# Patient Record
Sex: FEMALE | Race: WHITE | NOT HISPANIC OR LATINO | Employment: FULL TIME | URBAN - METROPOLITAN AREA
[De-identification: names, ages, dates, MRNs, and addresses within clinical notes are randomized per-mention and may not be internally consistent; named-entity substitution may affect disease eponyms.]

---

## 2024-01-16 ENCOUNTER — APPOINTMENT (OUTPATIENT)
Dept: RADIOLOGY | Facility: HOSPITAL | Age: 34
End: 2024-01-16

## 2024-01-16 ENCOUNTER — HOSPITAL ENCOUNTER (EMERGENCY)
Facility: HOSPITAL | Age: 34
Discharge: HOME | End: 2024-01-16

## 2024-01-16 VITALS
TEMPERATURE: 97.3 F | WEIGHT: 202 LBS | BODY MASS INDEX: 32.47 KG/M2 | SYSTOLIC BLOOD PRESSURE: 107 MMHG | HEART RATE: 89 BPM | HEIGHT: 66 IN | RESPIRATION RATE: 18 BRPM | DIASTOLIC BLOOD PRESSURE: 72 MMHG

## 2024-01-16 DIAGNOSIS — N83.202 CYST OF LEFT OVARY: Primary | ICD-10-CM

## 2024-01-16 LAB
ALBUMIN SERPL BCP-MCNC: 4 G/DL (ref 3.4–5)
ALP SERPL-CCNC: 56 U/L (ref 33–110)
ALT SERPL W P-5'-P-CCNC: 22 U/L (ref 7–45)
ANION GAP SERPL CALC-SCNC: 13 MMOL/L (ref 10–20)
APPEARANCE UR: ABNORMAL
AST SERPL W P-5'-P-CCNC: 16 U/L (ref 9–39)
B-HCG SERPL-ACNC: <2 MIU/ML
BASOPHILS # BLD AUTO: 0.04 X10*3/UL (ref 0–0.1)
BASOPHILS NFR BLD AUTO: 0.3 %
BILIRUB SERPL-MCNC: 0.3 MG/DL (ref 0–1.2)
BILIRUB UR STRIP.AUTO-MCNC: NEGATIVE MG/DL
BUN SERPL-MCNC: 14 MG/DL (ref 6–23)
CALCIUM SERPL-MCNC: 8.5 MG/DL (ref 8.6–10.3)
CHLORIDE SERPL-SCNC: 103 MMOL/L (ref 98–107)
CLUE CELLS SPEC QL WET PREP: NORMAL
CO2 SERPL-SCNC: 24 MMOL/L (ref 21–32)
COLOR UR: YELLOW
CREAT SERPL-MCNC: 0.71 MG/DL (ref 0.5–1.05)
EGFRCR SERPLBLD CKD-EPI 2021: >90 ML/MIN/1.73M*2
EOSINOPHIL # BLD AUTO: 0.08 X10*3/UL (ref 0–0.7)
EOSINOPHIL NFR BLD AUTO: 0.7 %
ERYTHROCYTE [DISTWIDTH] IN BLOOD BY AUTOMATED COUNT: 13.1 % (ref 11.5–14.5)
GLUCOSE SERPL-MCNC: 93 MG/DL (ref 74–99)
GLUCOSE UR STRIP.AUTO-MCNC: NEGATIVE MG/DL
HCT VFR BLD AUTO: 44 % (ref 36–46)
HGB BLD-MCNC: 14.5 G/DL (ref 12–16)
IMM GRANULOCYTES # BLD AUTO: 0.07 X10*3/UL (ref 0–0.7)
IMM GRANULOCYTES NFR BLD AUTO: 0.6 % (ref 0–0.9)
KETONES UR STRIP.AUTO-MCNC: NEGATIVE MG/DL
LACTATE SERPL-SCNC: 0.8 MMOL/L (ref 0.4–2)
LEUKOCYTE ESTERASE UR QL STRIP.AUTO: NEGATIVE
LIPASE SERPL-CCNC: 35 U/L (ref 9–82)
LYMPHOCYTES # BLD AUTO: 3 X10*3/UL (ref 1.2–4.8)
LYMPHOCYTES NFR BLD AUTO: 25.7 %
MCH RBC QN AUTO: 28.7 PG (ref 26–34)
MCHC RBC AUTO-ENTMCNC: 33 G/DL (ref 32–36)
MCV RBC AUTO: 87 FL (ref 80–100)
MONOCYTES # BLD AUTO: 0.83 X10*3/UL (ref 0.1–1)
MONOCYTES NFR BLD AUTO: 7.1 %
NEUTROPHILS # BLD AUTO: 7.64 X10*3/UL (ref 1.2–7.7)
NEUTROPHILS NFR BLD AUTO: 65.6 %
NITRITE UR QL STRIP.AUTO: NEGATIVE
NRBC BLD-RTO: 0 /100 WBCS (ref 0–0)
PH UR STRIP.AUTO: 5 [PH]
PLATELET # BLD AUTO: 214 X10*3/UL (ref 150–450)
POTASSIUM SERPL-SCNC: 4.7 MMOL/L (ref 3.5–5.3)
PROT SERPL-MCNC: 6.9 G/DL (ref 6.4–8.2)
PROT UR STRIP.AUTO-MCNC: NEGATIVE MG/DL
RBC # BLD AUTO: 5.05 X10*6/UL (ref 4–5.2)
RBC # UR STRIP.AUTO: NEGATIVE /UL
SODIUM SERPL-SCNC: 135 MMOL/L (ref 136–145)
SP GR UR STRIP.AUTO: 1.01
T VAGINALIS SPEC QL WET PREP: NORMAL
UROBILINOGEN UR STRIP.AUTO-MCNC: <2 MG/DL
WBC # BLD AUTO: 11.7 X10*3/UL (ref 4.4–11.3)
WBC VAG QL WET PREP: NORMAL
YEAST VAG QL WET PREP: NORMAL

## 2024-01-16 PROCEDURE — 2550000001 HC RX 255 CONTRASTS

## 2024-01-16 PROCEDURE — 84702 CHORIONIC GONADOTROPIN TEST: CPT

## 2024-01-16 PROCEDURE — 81003 URINALYSIS AUTO W/O SCOPE: CPT

## 2024-01-16 PROCEDURE — 74177 CT ABD & PELVIS W/CONTRAST: CPT

## 2024-01-16 PROCEDURE — 76830 TRANSVAGINAL US NON-OB: CPT | Performed by: RADIOLOGY

## 2024-01-16 PROCEDURE — 84075 ASSAY ALKALINE PHOSPHATASE: CPT

## 2024-01-16 PROCEDURE — 83690 ASSAY OF LIPASE: CPT

## 2024-01-16 PROCEDURE — 36415 COLL VENOUS BLD VENIPUNCTURE: CPT

## 2024-01-16 PROCEDURE — 99285 EMERGENCY DEPT VISIT HI MDM: CPT

## 2024-01-16 PROCEDURE — 76856 US EXAM PELVIC COMPLETE: CPT

## 2024-01-16 PROCEDURE — 85025 COMPLETE CBC W/AUTO DIFF WBC: CPT

## 2024-01-16 PROCEDURE — 83605 ASSAY OF LACTIC ACID: CPT

## 2024-01-16 PROCEDURE — 87800 DETECT AGNT MULT DNA DIREC: CPT | Mod: AHULAB

## 2024-01-16 PROCEDURE — 74177 CT ABD & PELVIS W/CONTRAST: CPT | Performed by: RADIOLOGY

## 2024-01-16 PROCEDURE — 87210 SMEAR WET MOUNT SALINE/INK: CPT

## 2024-01-16 PROCEDURE — 76856 US EXAM PELVIC COMPLETE: CPT | Performed by: RADIOLOGY

## 2024-01-16 RX ORDER — KETOROLAC TROMETHAMINE 30 MG/ML
15 INJECTION, SOLUTION INTRAMUSCULAR; INTRAVENOUS ONCE
Status: DISCONTINUED | OUTPATIENT
Start: 2024-01-16 | End: 2024-01-16 | Stop reason: HOSPADM

## 2024-01-16 RX ORDER — NAPROXEN 500 MG/1
500 TABLET ORAL EVERY 12 HOURS
Qty: 6 TABLET | Refills: 0 | Status: SHIPPED | OUTPATIENT
Start: 2024-01-16 | End: 2024-01-19

## 2024-01-16 RX ORDER — ACETAMINOPHEN 325 MG/1
975 TABLET ORAL ONCE
Status: COMPLETED | OUTPATIENT
Start: 2024-01-16 | End: 2024-01-16

## 2024-01-16 RX ORDER — KETOROLAC TROMETHAMINE 30 MG/ML
INJECTION, SOLUTION INTRAMUSCULAR; INTRAVENOUS
Status: DISCONTINUED
Start: 2024-01-16 | End: 2024-01-16 | Stop reason: HOSPADM

## 2024-01-16 RX ORDER — ACETAMINOPHEN 500 MG
1000 TABLET ORAL EVERY 8 HOURS PRN
Qty: 18 TABLET | Refills: 0 | Status: SHIPPED | OUTPATIENT
Start: 2024-01-16 | End: 2024-01-19

## 2024-01-16 RX ADMIN — ACETAMINOPHEN 975 MG: 325 TABLET ORAL at 11:15

## 2024-01-16 RX ADMIN — IOHEXOL 75 ML: 350 INJECTION, SOLUTION INTRAVENOUS at 15:10

## 2024-01-16 ASSESSMENT — PAIN SCALES - GENERAL: PAINLEVEL_OUTOF10: 6

## 2024-01-16 ASSESSMENT — PAIN DESCRIPTION - LOCATION: LOCATION: PELVIS

## 2024-01-16 ASSESSMENT — COLUMBIA-SUICIDE SEVERITY RATING SCALE - C-SSRS
1. IN THE PAST MONTH, HAVE YOU WISHED YOU WERE DEAD OR WISHED YOU COULD GO TO SLEEP AND NOT WAKE UP?: NO
2. HAVE YOU ACTUALLY HAD ANY THOUGHTS OF KILLING YOURSELF?: NO
6. HAVE YOU EVER DONE ANYTHING, STARTED TO DO ANYTHING, OR PREPARED TO DO ANYTHING TO END YOUR LIFE?: NO

## 2024-01-16 ASSESSMENT — PAIN - FUNCTIONAL ASSESSMENT: PAIN_FUNCTIONAL_ASSESSMENT: 0-10

## 2024-01-16 NOTE — ED PROVIDER NOTES
HPI   Chief Complaint   Patient presents with    Pelvic Pain        33-year-old female no significant past medical history presents the ED today with a chief concern of pelvic pain.  Patient is a symptoms have been present for 8 months now.  She states that initially when this started her PCP did an STD check and diagnosed her with chlamydia and her symptoms went away.  She states that over the past couple months they have returned.  She states that she endorses bilateral pelvic pain worse when she works out or when she has intercourse.  She states that over the past 2 weeks her symptoms have worsened.  She states that her symptoms are typically worse before urination or when she has a bowel movement.  She denies any fever/chills, nausea/vomiting.  Denies any diarrhea or hematochezia.  Endorses slight suprapubic tenderness.  Denies dysuria, urinary urgency/frequency, hematuria.  She denies any vaginal discharge or vaginal bleeding.  States that the pain is a cramping pain.  States that she does feel a little bit of pain that wraps around into her low back on the left side which started over the past 2 weeks as well.  She denies saddle anesthesia.  Denies any urinary/fecal incontinence or retention.  She states that she never got a formal diagnosis but her PCP thought she maybe had PCOS.  She has no other symptoms or concerns at this time.  Patient has not tried anything at home for her symptoms.  She denies any previous abdominal surgeries.      History provided by:  Patient   used: No                        No data recorded                Patient History   No past medical history on file.  No past surgical history on file.  No family history on file.  Social History     Tobacco Use    Smoking status: Not on file    Smokeless tobacco: Not on file   Substance Use Topics    Alcohol use: Not on file    Drug use: Not on file       Physical Exam   ED Triage Vitals [01/16/24 0942]   Temp Heart Rate  Resp BP   36.3 °C (97.3 °F) 89 18 (!) 124/96      SpO2 Temp Source Heart Rate Source Patient Position   -- Temporal -- --      BP Location FiO2 (%)     Left arm --       Physical Exam  Constitutional: Vital signs per nursing notes.  Well developed, well nourished.  No acute distress.    Psychiatric: alert and oriented to person, place, and time; no abnormalities of mood or affect; memory intact  Eyes: PERRL; conjunctivae and lids normal  ENT: Ears normal externally; face symmetric. voice normal  Neck: neck supple, no meningismus; trachea midline without deviation  Respiratory: normal respiratory effort and excursion; no rales, rhonchi, or wheezes; equal air entry  Cardiovascular: RRR, 2+ radial pulses extremities   Neurological: normal speech; CN II-XII grossly intact, normal motor and sensory function  GI: no distention, soft.  Minimal tenderness in suprapubic region.  No rebound tenderness or guarding.  No palpable masses.  No pulsatile masses.  No CVA tenderness.  : Deferred  Musculoskeletal: normal gait and station; normal digits and nails; normal to palpation; normal strength/tone; neurovascular status intact.  Skin: normal to inspection; normal to palpation; no rash    ED Course & MDM   ED Course as of 01/16/24 1755   Tue Jan 16, 2024   1544 FINDINGS:  UTERUS:  The uterus is grossly unremarkable in appearance.      The uterus measures at 7.2 cm in length and 3.0 x 4.3 cm in AP and  transverse diameters.      ENDOMETRIUM:  The endometrium measures a thickness of 6 mm, which is normal.      RIGHT ADNEXA:  The right ovary measures at 2.4 x 3.2 x 2.7 cm.      Fall colles are seen within the right ovary.      Normal-appearing color Doppler flow is seen in the right ovary.      No right adnexal mass is identified.      LEFT ADNEXA:  The left ovary measures at 5.1 x 5.9 x 4.5 cm.      A complex cyst is seen within the left ovary, measuring at up to 3.5  x 4.7 x 3.5 cm in diameter.      Normal-appearing color  Doppler flow is seen in the left ovary.      No left adnexal masses identified.      CUL DE SAC:  No free fluid is identified.      IMPRESSION:  1. Complex cyst in the left ovary, likely representing a hemorrhagic  cyst.  2. Otherwise unremarkable ultrasound appearance of the pelvis.      MACRO:  None      Signed by: Dominic Valentine 1/16/2024 1:40 PM  Dictation workstation:   [id number]   []   1544 IMPRESSION:  1.  5 cm left ovarian cyst. Trace pelvic free fluid likely  physiologic.  2. No additional acute findings.      MACRO:  None.      Signed by: Randy Bennett 1/16/2024 3:33 PM  Dictation workstation:   [id number]   [MC]   1544 CBC shows no evidence of leukocytosis or anemia.  CMP shows no acute or acute liver injury.  Urinalysis shows no UTI.  Lactate, lipase, hCG normal.  Wet prep shows no trichomonas, clue cells, or yeast.  Gonorrhea and Chlamydia pending.  Patient was not treated in advance for these in the ED. []   1623 Discussed with Dr. Hough who recommends discharging this patient and having her follow-up with him outpatient this week. []      ED Course User Index  [MC] Gilberto Francisco PA-C         Diagnoses as of 01/16/24 1755   Cyst of left ovary       Medical Decision Making  33-year-old female no significant past medical history presents the ED today with a chief concern of pelvic pain.  Vital signs are reassuring.  Patient overall appears well and is nontoxic-appearing.  She was given Tylenol in the ED. patient was also given a dose of Toradol before she left.  She overall appears well and is nontoxic-appearing.  She has no signs of cord compression.  No emergent MRI indicated at this time.  She has 2+ symmetric radial pulses.  I am not concerned for any AAA at this time.  Ultrasound shows no evidence of ovarian torsion.  She does have a 5 cm cyst noted on CT scan.  Patient states that she was having pain when she was at the ultrasound.  I have low suspicion for any intermittent ovarian torsion  at this time however I did warn patient about this risk.  Her hemoglobin is stable.  She has no rupture of the cyst at this time.  She is satting well on room air.  She has no systemic signs or symptoms.  I am not concerned for any abscess at this time.  Discussed with OB/GYN who recommends having this patient follow-up outpatient next week.  Her urine shows no evidence of UTI and the remainder of her lab work is stable with no leukocytosis.  Her white count is 11.7.  No left shift.  No surgical abdomen.  No signs of PID or TOA.  GC/CHL cultures pending.  Patient is not immunocompromised.  Discussed with attending physician but I did not have him see this patient. Discussed my impression and findings with patient she feels comfortable returning home.  We discussed very strict return precautions including returning for any new or worsening signs or symptoms.  Patient is in agreement this plan.  She will follow-up with her PCP and OB/GYN within 3 days.  Again discussed strict return precautions.  Discussed signs and symptoms of ovarian torsion.  I offered to send analgesics to patient's pharmacy but she declined.    Differential diagnosis: PCOS, fibroids, cyst, ovarian torsion, PID, TOA, GC/CHL, UTI, pyelonephritis, ureterolithiasis, nephrolithiasis, appendicitis, cholecystitis    Disposition/treatment  1.  Cyst of left ovary    Shared decision-making was used patient feels comfortable returning home     Patient was advised to follow up with recommended provider in 1 day1 for another evaluation and exam. I advised patient/guardian to return or go to closest emergency room immediately if symptoms change, get worse, new symptoms develop prior to follow up. If there is no improvement in symptoms in the next 24 hours they are advised to return for further evaluation and exam. I also explained the plan and treatment course. Patient/guardian is in agreement with plan, treatment course, and follow up and states verbally that  they will comply.    Homegoing. I discussed the differential; results and discharge plan with the patient and/or family/friend/caregiver if present.  I emphasized the importance of follow-up with the physician I referred them to in the timeframe recommended.  I explained reasons for the patient to return to the Emergency Department. They agreed that if they feel their condition is worsening or if they have any other concern they should call 911 immediately for further assistance. I gave the patient an opportunity to ask all questions they had and answered all of them accordingly. They understand return precautions and discharge instructions. The patient and/or family/friend/caregiver expressed understanding verbally and that they would comply.        This note has been transcribed using voice recognition and may contain grammatical errors, misplaced words, incorrect words, incorrect phrases or other errors.        Procedure  Procedures     Gilberto Francisco PA-C  01/16/24 0529

## 2024-01-16 NOTE — DISCHARGE INSTRUCTIONS
Return to the ED immediately if you have any new or worsening signs or symptoms  Please follow-up with gynecology within 3 days  If you begin to experience fever/chills, nausea/vomiting, severe left-sided abdominal pain, or any other new or worsening signs or symptoms please return to the ED immediately

## 2024-01-16 NOTE — ED TRIAGE NOTES
Pt arrived via private vehicle with chief c/o of pelvic pain. Pt states the pain started last year and she tested positive for chlamydia. Pt reports being treated and following up GYN for follow up as the pain persisted. Pt states the pain went away for a short time but came back shortly after, and she endorses pain during sex , during exercise and pelvic pain during urination/BM's. Pt has hx of possible PCOS, still does not get MP after being off BC for over a year. She did have 4 days of bleeding in October. Pt states she changed her diet/lifestyle to see if that would help with no changes. Pt states she had UA completed at  which was negative for a UTI so she was sent to ED for US and bloodwork.

## 2024-01-16 NOTE — ED NOTES
CHW talked to patient regarding not having any insurance and no primary care physician. CHW explained the [facility] that has various programs to help with medical bills, insurance and no insurance issues. CHW gave the form to patient to complete and explained that some will follow-up from [facility]. Patient expressed appreciation.     [facility]  01/16/24 2723

## 2024-01-17 ENCOUNTER — OFFICE VISIT (OUTPATIENT)
Dept: OBSTETRICS AND GYNECOLOGY | Facility: CLINIC | Age: 34
End: 2024-01-17

## 2024-01-17 VITALS — WEIGHT: 208 LBS | BODY MASS INDEX: 33.43 KG/M2 | HEIGHT: 66 IN

## 2024-01-17 DIAGNOSIS — E28.2 PCOS (POLYCYSTIC OVARIAN SYNDROME): Primary | ICD-10-CM

## 2024-01-17 DIAGNOSIS — N83.209 CYST OF OVARY, UNSPECIFIED LATERALITY: ICD-10-CM

## 2024-01-17 LAB
C TRACH RRNA SPEC QL NAA+PROBE: NEGATIVE
N GONORRHOEA DNA SPEC QL PROBE+SIG AMP: NEGATIVE

## 2024-01-17 PROCEDURE — 99204 OFFICE O/P NEW MOD 45 MIN: CPT | Performed by: OBSTETRICS & GYNECOLOGY

## 2024-01-17 PROCEDURE — 1036F TOBACCO NON-USER: CPT | Performed by: OBSTETRICS & GYNECOLOGY

## 2024-01-17 RX ORDER — MEDROXYPROGESTERONE ACETATE 10 MG/1
10 TABLET ORAL DAILY
Qty: 14 TABLET | Refills: 3 | Status: SHIPPED | OUTPATIENT
Start: 2024-01-17 | End: 2024-04-17 | Stop reason: SDUPTHER

## 2024-01-17 RX ORDER — METFORMIN HYDROCHLORIDE 500 MG/1
500 TABLET, EXTENDED RELEASE ORAL
Qty: 30 TABLET | Refills: 11 | Status: SHIPPED | OUTPATIENT
Start: 2024-01-17 | End: 2024-04-17 | Stop reason: SDUPTHER

## 2024-01-17 NOTE — PROGRESS NOTES
Samuel Seals is a 33 y.o. female who presents with a chief complaint of Follow-up (Patient went to the ER with pelvic pain, had CT and ultrasound done and was told she had ovarian left side)      SUBJECTIVE  Patient presents for follow-up from the emergency room.  She was seen and had a ovarian cyst that appeared to be hemorrhagic corpus luteum.  She has a history of only 1.  In the last year or so.  She was birth control previously and has been very irregular since coming off.  She has been described as prediabetic in the past and was diagnosed with polycystic ovarian syndrome.  She currently has no insurance so really cannot do labs today.  We discussed all this at length    Past Medical History:   Diagnosis Date    Anxiety     Depression     Hypothyroid     PTSD (post-traumatic stress disorder)      Past Surgical History:   Procedure Laterality Date    TONSILLECTOMY       Social History     Socioeconomic History    Marital status: Single     Spouse name: None    Number of children: None    Years of education: None    Highest education level: None   Occupational History    None   Tobacco Use    Smoking status: Never    Smokeless tobacco: Never   Vaping Use    Vaping Use: Some days    Substances: Nicotine    Devices: Disposable   Substance and Sexual Activity    Alcohol use: Not Currently    Drug use: Yes     Types: Marijuana    Sexual activity: Not Currently   Other Topics Concern    None   Social History Narrative    None     Social Determinants of Health     Financial Resource Strain: Not on file   Food Insecurity: Not on file   Transportation Needs: Not on file   Physical Activity: Not on file   Stress: Not on file   Social Connections: Not on file   Intimate Partner Violence: Not on file   Housing Stability: Not on file     No family history on file.    OB History    Para Term  AB Living   3 0 0 0 3 0   SAB IAB Ectopic Multiple Live Births   3 0 0 0 0      # Outcome Date GA Lbr Steven/2nd  "Weight Sex Delivery Anes PTL Lv   3 SAB            2 SAB            1 SAB                OBJECTIVE  Allergies   Allergen Reactions    Gluten GI Upset      (Not in a hospital admission)       Review of Systems  History obtained from the patient  General ROS: negative  Psychological ROS: negative  Gastrointestinal ROS: negative  Musculoskeletal ROS: negative  Physical Exam  General Appearance: awake, alert, oriented, in no acute distress, well developed, well nourished, and in no acute distress  Skin: there are no suspicious lesions or rashes of concern, skin color, texture, turgor are normal; there are no bruises, rashes or lesions.  Head/Face: NCAT  Eyes: No gross abnormalities., PERRL, and EOMI  Neck: neck- supple, no mass, non-tender  Back: no pain to palpation  Abdomen: Soft, non-tender, normal bowel sounds; no bruits, organomegaly or masses.  Extremities: Extremities warm to touch, pink, with no edema.  Musculoskeletal: negative    Ht 1.676 m (5' 6\")   Wt 94.3 kg (208 lb)   LMP 09/26/2023 (Exact Date)   BMI 33.57 kg/m²    Problem List Items Addressed This Visit    None  Visit Diagnoses       PCOS (polycystic ovarian syndrome)    -  Primary    Relevant Medications    metFORMIN  mg 24 hr tablet    medroxyPROGESTERone (Provera) 10 mg tablet    Cyst of ovary, unspecified laterality        Relevant Medications    metFORMIN  mg 24 hr tablet    medroxyPROGESTERone (Provera) 10 mg tablet                 "

## 2024-04-17 ENCOUNTER — OFFICE VISIT (OUTPATIENT)
Dept: OBSTETRICS AND GYNECOLOGY | Facility: CLINIC | Age: 34
End: 2024-04-17

## 2024-04-17 VITALS
SYSTOLIC BLOOD PRESSURE: 98 MMHG | BODY MASS INDEX: 31.66 KG/M2 | DIASTOLIC BLOOD PRESSURE: 72 MMHG | WEIGHT: 197 LBS | HEIGHT: 66 IN

## 2024-04-17 DIAGNOSIS — E28.2 PCOS (POLYCYSTIC OVARIAN SYNDROME): ICD-10-CM

## 2024-04-17 DIAGNOSIS — N83.209 CYST OF OVARY, UNSPECIFIED LATERALITY: ICD-10-CM

## 2024-04-17 PROCEDURE — 1036F TOBACCO NON-USER: CPT | Performed by: OBSTETRICS & GYNECOLOGY

## 2024-04-17 PROCEDURE — 99214 OFFICE O/P EST MOD 30 MIN: CPT | Performed by: OBSTETRICS & GYNECOLOGY

## 2024-04-17 RX ORDER — MEDROXYPROGESTERONE ACETATE 10 MG/1
10 TABLET ORAL DAILY
Qty: 30 TABLET | Refills: 3 | Status: SHIPPED | OUTPATIENT
Start: 2024-04-17 | End: 2024-04-19 | Stop reason: SDUPTHER

## 2024-04-17 RX ORDER — METFORMIN HYDROCHLORIDE 500 MG/1
500 TABLET, EXTENDED RELEASE ORAL
Qty: 90 TABLET | Refills: 3 | Status: SHIPPED | OUTPATIENT
Start: 2024-04-17 | End: 2025-04-17

## 2024-04-17 NOTE — PROGRESS NOTES
Samuel Seals is a 33 y.o. female who presents with a chief complaint of Follow-up (Medication follow up)      SUBJECTIVE  Patient presents to go over her medications for her PCOS.  She has been having monthly menses on her Provera and metformin.  Patient has lost about 10 pounds in the last few months with that.  She does she is going to stop the Provera and continue the metformin.  The metformin at 16 weeks gestation    Past Medical History:   Diagnosis Date    Anxiety     Depression     Hypothyroid     PTSD (post-traumatic stress disorder)      Past Surgical History:   Procedure Laterality Date    TONSILLECTOMY       Social History     Socioeconomic History    Marital status: Single     Spouse name: None    Number of children: None    Years of education: None    Highest education level: None   Occupational History    None   Tobacco Use    Smoking status: Never    Smokeless tobacco: Never   Vaping Use    Vaping status: Some Days    Substances: Nicotine    Devices: Disposable   Substance and Sexual Activity    Alcohol use: Not Currently    Drug use: Yes     Types: Marijuana    Sexual activity: Not Currently   Other Topics Concern    None   Social History Narrative    None     Social Determinants of Health     Financial Resource Strain: Not on File (8/26/2019)    Received from [employer]     Financial Resource Strain     Financial Resource Strain: 0   Food Insecurity: Not on File (8/26/2019)    Received from [employer]     Food Insecurity     Food: 0   Transportation Needs: Not on File (8/26/2019)    Received from [employer]     Transportation Needs     Transportation: 0   Physical Activity: Not on File (8/26/2019)    Received from [employer]     Physical Activity     Physical Activity: 0   Stress: Not on File (8/26/2019)    Received from [employer]     Stress     Stress: 0   Social Connections: Not on File (8/26/2019)    Received from [employer]     Social Connections     Social Connections and Isolation: 0   Intimate Partner Violence: Not  "on file   Housing Stability: Not on File (2019)    Received from [employer]     Housin     No family history on file.    OB History    Para Term  AB Living   3 0 0 0 3 0   SAB IAB Ectopic Multiple Live Births   3 0 0 0 0      # Outcome Date GA Lbr Steven/2nd Weight Sex Delivery Anes PTL Lv   3 SAB            2 SAB            1 SAB                OBJECTIVE  Allergies   Allergen Reactions    Gluten GI Upset      (Not in a hospital admission)       Review of Systems  History obtained from the patient  General ROS: negative  Psychological ROS: negative  Gastrointestinal ROS: negative  Musculoskeletal ROS: negative  Physical Exam  General Appearance: awake, alert, oriented, in no acute distress, well developed, well nourished, and in no acute distress  Skin: there are no suspicious lesions or rashes of concern, skin color, texture, turgor are normal; there are no bruises, rashes or lesions.  Head/Face: NCAT  Eyes: No gross abnormalities., PERRL, and EOMI  Neck: neck- supple, no mass, non-tender  Back: no pain to palpation  Abdomen: Soft, non-tender, normal bowel sounds; no bruits, organomegaly or masses.  Extremities: Extremities warm to touch, pink, with no edema.  Musculoskeletal: negative    BP 98/72   Ht 1.676 m (5' 6\")   Wt 89.4 kg (197 lb)   LMP 2024   BMI 31.80 kg/m²    Problem List Items Addressed This Visit    None  Visit Diagnoses       PCOS (polycystic ovarian syndrome)        Relevant Medications    medroxyPROGESTERone (Provera) 10 mg tablet    metFORMIN  mg 24 hr tablet    Cyst of ovary, unspecified laterality        Relevant Medications    medroxyPROGESTERone (Provera) 10 mg tablet    metFORMIN  mg 24 hr tablet         Return for  annual        "

## 2024-04-19 DIAGNOSIS — E28.2 PCOS (POLYCYSTIC OVARIAN SYNDROME): Primary | ICD-10-CM

## 2024-04-19 DIAGNOSIS — N83.209 CYST OF OVARY, UNSPECIFIED LATERALITY: ICD-10-CM

## 2024-04-19 RX ORDER — MEDROXYPROGESTERONE ACETATE 10 MG/1
10 TABLET ORAL DAILY
Qty: 10 TABLET | Refills: 0 | Status: SHIPPED | OUTPATIENT
Start: 2024-04-19 | End: 2024-04-29

## 2024-08-15 ENCOUNTER — APPOINTMENT (OUTPATIENT)
Dept: OBSTETRICS AND GYNECOLOGY | Facility: CLINIC | Age: 34
End: 2024-08-15